# Patient Record
Sex: MALE | Race: WHITE | NOT HISPANIC OR LATINO | Employment: UNEMPLOYED | URBAN - NONMETROPOLITAN AREA
[De-identification: names, ages, dates, MRNs, and addresses within clinical notes are randomized per-mention and may not be internally consistent; named-entity substitution may affect disease eponyms.]

---

## 2017-07-11 ENCOUNTER — OFFICE VISIT (OUTPATIENT)
Dept: ORTHOPEDIC SURGERY | Facility: CLINIC | Age: 67
End: 2017-07-11

## 2017-07-11 VITALS — WEIGHT: 241 LBS | HEIGHT: 72 IN | BODY MASS INDEX: 32.64 KG/M2

## 2017-07-11 DIAGNOSIS — M17.0 ARTHRITIS OF BOTH KNEES: Primary | ICD-10-CM

## 2017-07-11 PROBLEM — M06.9 RHEUMATOID ARTHRITIS (HCC): Status: ACTIVE | Noted: 2017-07-11

## 2017-07-11 PROBLEM — M17.9 OSTEOARTHRITIS OF KNEE: Status: ACTIVE | Noted: 2017-07-11

## 2017-07-11 PROBLEM — K21.9 GASTROESOPHAGEAL REFLUX DISEASE: Status: ACTIVE | Noted: 2017-07-11

## 2017-07-11 PROBLEM — E78.00 HYPERCHOLESTEROLEMIA: Status: ACTIVE | Noted: 2017-07-11

## 2017-07-11 PROCEDURE — 20610 DRAIN/INJ JOINT/BURSA W/O US: CPT | Performed by: ORTHOPAEDIC SURGERY

## 2017-07-11 RX ORDER — FLUTICASONE PROPIONATE 50 MCG
SPRAY, SUSPENSION (ML) NASAL
Refills: 1 | COMMUNITY
Start: 2017-07-02 | End: 2018-02-01 | Stop reason: ALTCHOICE

## 2017-07-11 RX ORDER — ACETAMINOPHEN/DIPHENHYDRAMINE 500MG-25MG
TABLET ORAL DAILY
Refills: 0 | COMMUNITY
Start: 2017-06-30

## 2017-07-11 RX ORDER — ATENOLOL 50 MG/1
TABLET ORAL 2 TIMES DAILY
Refills: 0 | COMMUNITY
Start: 2017-05-10

## 2017-07-11 RX ORDER — ROPINIROLE 0.25 MG/1
TABLET, FILM COATED ORAL
Refills: 0 | COMMUNITY
Start: 2017-06-13

## 2017-07-11 RX ORDER — NITROGLYCERIN 0.4 MG/1
TABLET SUBLINGUAL
Refills: 0 | COMMUNITY
Start: 2017-05-23

## 2017-07-11 RX ORDER — ISOSORBIDE MONONITRATE 120 MG/1
TABLET, EXTENDED RELEASE ORAL DAILY
Refills: 0 | COMMUNITY
Start: 2017-06-30

## 2017-07-11 RX ORDER — ATORVASTATIN CALCIUM 10 MG/1
TABLET, FILM COATED ORAL
Refills: 0 | COMMUNITY
Start: 2017-06-30 | End: 2017-11-06

## 2017-07-11 RX ORDER — GABAPENTIN 800 MG/1
TABLET ORAL
Refills: 0 | COMMUNITY
Start: 2017-05-22

## 2017-07-11 RX ORDER — ACETAMINOPHEN AND CODEINE PHOSPHATE 300; 30 MG/1; MG/1
TABLET ORAL
Refills: 0 | COMMUNITY
Start: 2017-05-31 | End: 2018-02-01 | Stop reason: ALTCHOICE

## 2017-07-11 RX ORDER — OMEPRAZOLE 40 MG/1
CAPSULE, DELAYED RELEASE ORAL
Refills: 0 | COMMUNITY
Start: 2017-06-30 | End: 2018-02-01 | Stop reason: ALTCHOICE

## 2017-07-11 NOTE — PROGRESS NOTES
"  Patient: Juan Edwards    YOB: 1950        History of Present Illness:   Patient presents for bilateral Visco supplementation injections of his knees.  He has received this in the past this done quite well for him.      Review of Systems:    Pertinent positives evaluated and listed in HPI, others systems completed by patient and reviewed today.         Physical Exam: 67 y.o. male  General Appearance:    Alert and oriented x 3, cooperative, in no acute distress                   Vitals:    07/11/17 1308   Weight: 241 lb (109 kg)   Height: 72\" (182.9 cm)          Essentially unchanged      Radiology:           Large Joint Arthrocentesis  Date/Time: 7/11/2017 1:28 PM  Consent given by: patient  Site marked: site marked  Supporting Documentation  Indications: pain and diagnostic evaluation   Procedure Details  Location: knee - R knee  Needle size: 25 G  Approach: anterolateral  Medications administered: 48 mg hylan 48 MG/6ML  Patient tolerance: patient tolerated the procedure well with no immediate complications    Large Joint Arthrocentesis  Date/Time: 7/11/2017 1:29 PM  Consent given by: patient  Site marked: site marked  Supporting Documentation  Indications: pain and diagnostic evaluation   Procedure Details  Location: knee - L knee  Needle size: 25 G  Approach: anterolateral  Medications administered: 48 mg hylan 48 MG/6ML  Patient tolerance: patient tolerated the procedure well with no immediate complications                Assessment/Plan:    Bilateral knee arthritis.  Repeated Synvisc 1 injections today.  We'll contact us in the future as needed      Discussion/Summary:    This chart was completed utilizing the dragon speech recognition software.  Grammatical errors, random word insertions, pronoun errors, and incomplete sentences or occasional consequences of the system due to software limitations, ambient noise, and hardware issues.  Any questions or concerns about the content, text, or " information contained within the body of this dictation should be directly addressed to the physician for clarification        This document was signed by Gustabo Bhardwaj M.D. July 11, 2017 1:27 PM

## 2017-08-23 ENCOUNTER — OFFICE VISIT (OUTPATIENT)
Dept: UROLOGY | Facility: CLINIC | Age: 67
End: 2017-08-23

## 2017-08-23 DIAGNOSIS — R53.83 OTHER FATIGUE: ICD-10-CM

## 2017-08-23 DIAGNOSIS — N40.0 BPH WITHOUT URINARY OBSTRUCTION: Primary | ICD-10-CM

## 2017-08-23 LAB
ANION GAP SERPL CALCULATED.3IONS-SCNC: 5.7 MMOL/L (ref 3.6–11.2)
BUN BLD-MCNC: 12 MG/DL (ref 7–21)
BUN/CREAT SERPL: 11.4 (ref 7–25)
CALCIUM SPEC-SCNC: 9.2 MG/DL (ref 7.7–10)
CHLORIDE SERPL-SCNC: 112 MMOL/L (ref 99–112)
CO2 SERPL-SCNC: 25.3 MMOL/L (ref 24.3–31.9)
CREAT BLD-MCNC: 1.05 MG/DL (ref 0.43–1.29)
GFR SERPL CREATININE-BSD FRML MDRD: 70 ML/MIN/1.73
GLUCOSE BLD-MCNC: 102 MG/DL (ref 70–110)
OSMOLALITY SERPL CALC.SUM OF ELEC: 284.9 MOSM/KG (ref 273–305)
POTASSIUM BLD-SCNC: 4 MMOL/L (ref 3.5–5.3)
PSA SERPL-MCNC: 1.33 NG/ML (ref 0–4)
SODIUM BLD-SCNC: 143 MMOL/L (ref 135–153)

## 2017-08-23 PROCEDURE — 36415 COLL VENOUS BLD VENIPUNCTURE: CPT | Performed by: UROLOGY

## 2017-08-23 PROCEDURE — 99203 OFFICE O/P NEW LOW 30 MIN: CPT | Performed by: UROLOGY

## 2017-08-23 PROCEDURE — 84153 ASSAY OF PSA TOTAL: CPT | Performed by: UROLOGY

## 2017-08-23 PROCEDURE — 80048 BASIC METABOLIC PNL TOTAL CA: CPT | Performed by: UROLOGY

## 2017-08-23 RX ORDER — TAMSULOSIN HYDROCHLORIDE 0.4 MG/1
1 CAPSULE ORAL NIGHTLY
Qty: 30 CAPSULE | Refills: 3 | Status: SHIPPED | OUTPATIENT
Start: 2017-08-23 | End: 2017-12-14 | Stop reason: SDUPTHER

## 2017-08-23 NOTE — PROGRESS NOTES
Chief Complaint:          No chief complaint on file.      HPI:   67 y.o. male.  67-year-old white male here to establish care has urgency and rare urge related incontinence gets up twice at night.  4 times during the day.  There is no dysuria.  There is no hematuria.  States he was hospitalized previously.  This was years ago he has a remote history of a prior stone.  He has no dribbling.  He has a good stream he is on no medications he sees Dr. Winston and has never had an examination.  She does not remember having a PSA.  He does not remember having a history of diabetes.  He is not a smoker.  He is accompanied by his wife who has a simple cyst in the kidney        Past Medical History:        Past Medical History:   Diagnosis Date   • Acid reflux    • Cardiac disease    • Hypertension    • Osteoarthritis     in both knees   • Rheumatoid arthritis          Current Meds:     Current Outpatient Prescriptions   Medication Sig Dispense Refill   • acetaminophen-codeine (TYLENOL #3) 300-30 MG per tablet TAKE 1 TABLET BY MOUTH AS NEEDED FOR PAIN  0   • atenolol (TENORMIN) 50 MG tablet   0   • atorvastatin (LIPITOR) 10 MG tablet   0   • fluticasone (FLONASE) 50 MCG/ACT nasal spray instill 2 sprays into each nostril once daily  1   • gabapentin (NEURONTIN) 800 MG tablet TAKE ONE TABLET BY MOUTH TWICE DAILY  0   • isosorbide mononitrate (IMDUR) 120 MG 24 hr tablet   0   • nitroglycerin (NITROSTAT) 0.4 MG SL tablet place 1 tablet under the tongue every 5 minutes for UP TO 3 doses...  (REFER TO PRESCRIPTION NOTES).  0   • omeprazole (priLOSEC) 40 MG capsule take 1 capsule by mouth once daily  0   • RA ASPIRIN EC 81 MG EC tablet   0   • rOPINIRole (REQUIP) 0.25 MG tablet take 1 tablet by mouth at bedtime  0     No current facility-administered medications for this visit.         Allergies:      Allergies   Allergen Reactions   • Propoxyphene    • Propranolol         Past Surgical History:     Past Surgical History:   Procedure  Laterality Date   • CATARACT EXTRACTION     • CORONARY ANGIOPLASTY WITH STENT PLACEMENT     • EYE SURGERY     • HERNIA REPAIR     • KNEE SURGERY     • SINUS SURGERY     • TONSILLECTOMY     • VEIN SURGERY           Social History:     Social History     Social History   • Marital status:      Spouse name: N/A   • Number of children: N/A   • Years of education: N/A     Occupational History   • Not on file.     Social History Main Topics   • Smoking status: Never Smoker   • Smokeless tobacco: Never Used   • Alcohol use No   • Drug use: No   • Sexual activity: Not on file     Other Topics Concern   • Not on file     Social History Narrative       Family History:     Family History   Problem Relation Age of Onset   • Cancer Sister    • Diabetes Sister    • Arthritis Sister      Osteoarthritis and Rheumatoid   • Osteoporosis Sister    • Hypertension Sister    • Gout Sister    • Cancer Brother    • Diabetes Brother    • Osteoporosis Brother    • Arthritis Brother      Osteoarthritis and Rheumatoid   • Hypertension Brother    • Gout Brother        Review of Systems:     Review of Systems   Constitutional: Negative.  Negative for chills, fatigue and fever.   Eyes: Negative.    Respiratory: Positive for shortness of breath. Negative for cough and wheezing.    Cardiovascular: Negative.  Negative for leg swelling.   Gastrointestinal: Negative.  Negative for abdominal pain, nausea and vomiting.   Endocrine: Negative.    Musculoskeletal: Positive for back pain. Negative for joint swelling.   Allergic/Immunologic: Negative.    Neurological: Positive for dizziness and headaches.   Hematological: Negative.    Psychiatric/Behavioral: Negative.  Negative for confusion.       Physical Exam:     Physical Exam   Constitutional: He is oriented to person, place, and time. He appears well-developed and well-nourished.   HENT:   Head: Normocephalic and atraumatic.   Eyes: Conjunctivae and EOM are normal. Pupils are equal, round, and  reactive to light.   Neck: Normal range of motion.   Cardiovascular: Normal rate, regular rhythm, normal heart sounds and intact distal pulses.    Pulmonary/Chest: Effort normal and breath sounds normal.   Abdominal: Soft. Bowel sounds are normal.   Genitourinary: Rectum normal, prostate normal and penis normal.   Genitourinary Comments: Soft nontender abdomen with no organomegaly, rigidity, or tenderness.  He has normal external genitalia and uncircumcised phallus with a freely movable foreskin bilaterally descended testes without masses there is no inguinal hernias adenopathy or abnormalities he had good rectal tone and a large smooth firm prostate.  There is no nodularity or any suspicious rectal abnormalities     Musculoskeletal: Normal range of motion.   Neurological: He is alert and oriented to person, place, and time. He has normal reflexes.   Skin: Skin is warm and dry.   Psychiatric: He has a normal mood and affect. His behavior is normal. Judgment and thought content normal.   Nursing note and vitals reviewed.      Procedure:       Assessment:   No diagnosis found.  No orders of the defined types were placed in this encounter.      Plan:   BPH-currently asymptomatic recommend only observation  PSA testing-I am recommending a PSA blood test that stands for prostate specific antigen.  I discussed the pathophysiology of PSA testing indicating its use in the diagnosis and management of prostate cancer.  I discussed the normal range being 0-4 but more appropriately being much closer to 0-2 in a normal male.  I discussed the fact that after certain age we don't recommend PSA testing especially in view of numerous comorbidities.  That this will not be a useful test.  I discussed many of the things that can artificially raised PSA including a recent infection, urinary tract infection, recent sexual intercourse.  Where even the type of movement such as manipulation of the prostate  riding a bicycle.  After all this  is taken into account when the test is reviewed  the most important use of PSA which is the velocity measurement in other words the change of PSA with time as a very important factor in the use and that we look for greater than 20% rise over a year to help us make the prediction of prostate cancer.  I also discussed that the use with prostate cancer indicating that after a radical prostatectomy the PSA should be 0 and any rise indicates an early biochemical recurrence**           This document has been electronically signed by EJ URBINA MD August 23, 2017 2:20 PM

## 2017-08-25 PROBLEM — N40.0 BPH WITHOUT URINARY OBSTRUCTION: Status: ACTIVE | Noted: 2017-08-25

## 2017-11-06 ENCOUNTER — OFFICE VISIT (OUTPATIENT)
Dept: CARDIOLOGY | Facility: CLINIC | Age: 67
End: 2017-11-06

## 2017-11-06 VITALS
HEIGHT: 72 IN | OXYGEN SATURATION: 96 % | DIASTOLIC BLOOD PRESSURE: 78 MMHG | SYSTOLIC BLOOD PRESSURE: 104 MMHG | WEIGHT: 247 LBS | BODY MASS INDEX: 33.46 KG/M2 | HEART RATE: 53 BPM

## 2017-11-06 DIAGNOSIS — E78.5 DYSLIPIDEMIA: ICD-10-CM

## 2017-11-06 DIAGNOSIS — E78.00 HYPERCHOLESTEROLEMIA: ICD-10-CM

## 2017-11-06 DIAGNOSIS — I20.0 UNSTABLE ANGINA PECTORIS (HCC): ICD-10-CM

## 2017-11-06 DIAGNOSIS — I10 ESSENTIAL HYPERTENSION: ICD-10-CM

## 2017-11-06 DIAGNOSIS — I25.119 CORONARY ARTERY DISEASE INVOLVING NATIVE CORONARY ARTERY OF NATIVE HEART WITH ANGINA PECTORIS (HCC): Primary | ICD-10-CM

## 2017-11-06 PROCEDURE — 99204 OFFICE O/P NEW MOD 45 MIN: CPT | Performed by: INTERNAL MEDICINE

## 2017-11-06 RX ORDER — RANOLAZINE 1000 MG/1
1000 TABLET, EXTENDED RELEASE ORAL 2 TIMES DAILY
COMMUNITY

## 2017-11-06 RX ORDER — ROSUVASTATIN CALCIUM 10 MG/1
10 TABLET, COATED ORAL DAILY
COMMUNITY

## 2017-11-06 RX ORDER — ACETAMINOPHEN 500 MG
500 TABLET ORAL EVERY 6 HOURS PRN
COMMUNITY

## 2017-11-06 RX ORDER — LORATADINE 10 MG/1
CAPSULE, LIQUID FILLED ORAL DAILY
COMMUNITY

## 2017-11-06 NOTE — PROGRESS NOTES
1950  [phone]      11/06/2017    PCP: No Known Provider  [facility]  [place] [id number]    IDENTIFICATION: A 67 y.o. male   CC:  Chief Complaint   Patient presents with   • Chest Pain     PROBLEM LIST:  1. CAD   A)Chest pain and abnormal MPS 2/16 ischemia in inferior and inferolateral region, EF 50%   B)[facility] Dr. Toro 3/16 LAD 70% involving first diagonal with more distal diagonal 90 disease treated with medical therapy.  The CX had no significant disease.  The RCA                is dominant and has a significant 99% lesion treated with Stent. Normal EF              C)  Chest pain syndrome treated with Imdur, Ranexa.   2. HTN  3. HLD  4. Obestiy  5. Gerd  6. Chronic back pain, remote back surgery.  7. BPH  8. OA    Allergies  Allergies   Allergen Reactions   • Propoxyphene    • Propranolol Rash       Current Medications    Current Outpatient Prescriptions:   •  acetaminophen (TYLENOL) 500 MG tablet, Take 500 mg by mouth Every 6 (Six) Hours As Needed for Mild Pain ., Disp: , Rfl:   •  acetaminophen-codeine (TYLENOL #3) 300-30 MG per tablet, TAKE 1 TABLET BY MOUTH AS NEEDED FOR PAIN, Disp: , Rfl: 0  •  atenolol (TENORMIN) 50 MG tablet, 2 (Two) Times a Day., Disp: , Rfl: 0  •  fluticasone (FLONASE) 50 MCG/ACT nasal spray, instill 2 sprays into each nostril once daily, Disp: , Rfl: 1  •  gabapentin (NEURONTIN) 800 MG tablet, TAKE ONE TABLET BY MOUTH TWICE DAILY, Disp: , Rfl: 0  •  GLUCOSAMINE-CHONDROITIN PO, Take  by mouth Daily., Disp: , Rfl:   •  isosorbide mononitrate (IMDUR) 120 MG 24 hr tablet, Daily., Disp: , Rfl: 0  •  Loratadine 10 MG capsule, Take  by mouth Daily., Disp: , Rfl:   •  nitroglycerin (NITROSTAT) 0.4 MG SL tablet, place 1 tablet under the tongue every 5 minutes for UP TO 3 doses...  (REFER TO PRESCRIPTION NOTES)., Disp: , Rfl: 0  •  omeprazole (priLOSEC) 40 MG capsule, take 1 capsule by mouth once daily, Disp: , Rfl: 0  •  RA ASPIRIN EC 81 MG EC tablet, Daily., Disp: , Rfl: 0  •   ranolazine (RANEXA) 1000 MG 12 hr tablet, Take 1,000 mg by mouth 2 (Two) Times a Day., Disp: , Rfl:   •  rOPINIRole (REQUIP) 0.25 MG tablet, take 1 tablet by mouth at bedtime, Disp: , Rfl: 0  •  rosuvastatin (CRESTOR) 10 MG tablet, Take 10 mg by mouth Daily., Disp: , Rfl:   •  tamsulosin (FLOMAX) 0.4 MG capsule 24 hr capsule, Take 1 capsule by mouth Every Night., Disp: 30 capsule, Rfl: 3    History of Present Illness   HPI  The patient is a pleasant 67 year old gentleman presents today for a consultation regarding a history of coronary disease at the request of his primary care provider Dr. Sal.  The patient has a known history of coronary disease is status post a RCA stent by Dr. Toro [facility] in 2016.  Today he reports that he was unhappy about having the stent placed and felt that it was not necessary.  He also reports he is aware that he's had other types of coronary disease during this cath was treated medical management and he requested something be done about it.  He states that recently the past couple weeks she's had used a couple sublingual nitroglycerin.  This pain occurred while at rest watching TV and is relieved with nitroglycerin one time.  He denies any exertional symptoms.  He is somewhat limited because of chronic back pain.  He denies any heart failure symptoms.  Denies palpitations syncope or near-syncope events.      ROS  Review of Systems   Constitutional: Negative.    HENT: Negative.    Eyes: Negative.    Respiratory: Negative.    Cardiovascular: Positive for chest pain.   Gastrointestinal: Negative.    Endocrine: Negative.    Genitourinary: Negative.    Musculoskeletal: Negative.    Skin: Negative.    Neurological: Negative.    Hematological: Negative.    Psychiatric/Behavioral: Negative.        SOCIAL HX  Social History     Social History   • Marital status:      Spouse name: N/A   • Number of children: N/A   • Years of education: N/A     Occupational History   • Not on  "file.     Social History Main Topics   • Smoking status: Never Smoker   • Smokeless tobacco: Never Used   • Alcohol use No   • Drug use: No   • Sexual activity: Defer     Other Topics Concern   • Not on file     Social History Narrative       FAMILY HX  Family History   Problem Relation Age of Onset   • Cancer Sister    • Diabetes Sister    • Arthritis Sister      Osteoarthritis and Rheumatoid   • Osteoporosis Sister    • Hypertension Sister    • Gout Sister    • Cancer Brother    • Diabetes Brother    • Osteoporosis Brother    • Arthritis Brother      Osteoarthritis and Rheumatoid   • Hypertension Brother    • Gout Brother    • Heart attack Father        Vitals:    11/06/17 1041   BP: 104/78   BP Location: Right arm   Patient Position: Sitting   Pulse: 53   SpO2: 96%   Weight: 247 lb (112 kg)   Height: 72\" (182.9 cm)       PHYSICAL EXAMINATION:  Physical Exam   Constitutional: He is oriented to person, place, and time. He appears well-developed and well-nourished.   HENT:   Head: Normocephalic and atraumatic.   Eyes: EOM are normal. Pupils are equal, round, and reactive to light. Right eye exhibits no discharge. Left eye exhibits no discharge.   Neck: Normal range of motion. Neck supple. No JVD present. No tracheal deviation present. No thyromegaly present.   Cardiovascular: Normal rate, regular rhythm and normal heart sounds.  Exam reveals no gallop and no friction rub.    No murmur heard.  Pulmonary/Chest: Effort normal and breath sounds normal. No stridor. No respiratory distress. He has no wheezes. He has no rales. He exhibits no tenderness.   Abdominal: Soft. Bowel sounds are normal.   Musculoskeletal: Normal range of motion. He exhibits edema.   Lymphadenopathy:     He has no cervical adenopathy.   Neurological: He is alert and oriented to person, place, and time. No cranial nerve deficit. Coordination normal.   Skin: Skin is warm and dry.       Diagnostic Data:  Procedures  No results found for: CHLPL, TRIG, " HDL, LDLDIRECT  Lab Results   Component Value Date    GLUCOSE 102 08/23/2017    BUN 12 08/23/2017    CREATININE 1.05 08/23/2017     08/23/2017    K 4.0 08/23/2017     08/23/2017    CO2 25.3 08/23/2017     No results found for: HGBA1C  No results found for: WBC, HGB, HCT, PLT    ASSESSMENT:   Diagnosis Plan   1. Coronary artery disease involving native coronary artery of native heart with angina pectoris     2. Hypercholesterolemia     3. Essential hypertension     4. Dyslipidemia         PLAN:  · MPS  · Continue medical therapy asa,bb,statin, and Imdur.    Cristobal Vigil MD, FACC

## 2017-11-09 ENCOUNTER — OFFICE VISIT (OUTPATIENT)
Dept: UROLOGY | Facility: CLINIC | Age: 67
End: 2017-11-09

## 2017-11-09 VITALS — HEIGHT: 72 IN | BODY MASS INDEX: 33.46 KG/M2 | WEIGHT: 247 LBS

## 2017-11-09 DIAGNOSIS — N40.0 BPH WITHOUT URINARY OBSTRUCTION: Primary | ICD-10-CM

## 2017-11-09 DIAGNOSIS — N32.81 DETRUSOR INSTABILITY: ICD-10-CM

## 2017-11-09 PROCEDURE — 99213 OFFICE O/P EST LOW 20 MIN: CPT | Performed by: UROLOGY

## 2017-11-09 NOTE — PROGRESS NOTES
Chief Complaint:          Chief Complaint   Patient presents with   • Benign Prostatic Hypertrophy       HPI:   67 y.o. male.  67-year-old white male here to establish care has urgency and rare urge related incontinence gets up twice at night.  4 times during the day.  There is no dysuria.  There is no hematuria.  States he was hospitalized previously.  This was years ago he has a remote history of a prior stone.  He has no dribbling.  He has a good stream he is on no medications he sees Dr. Winston and has never had an examination.  She does not remember having a PSA.  He does not remember having a history of diabetes.  He is not a smoker.  He is accompanied by His wife who has a simple renal cyst his PSA was 1.330 his creatinine is 1.05.  He went from getting up multiple times a night to 0 to one time in 2-3 times during the day he still not satisfied with this.  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     • acetaminophen-codeine (TYLENOL #3) 300-30 MG per tablet TAKE 1 TABLET BY MOUTH AS NEEDED FOR PAIN  0   • atenolol (TENORMIN) 50 MG tablet 2 (Two) Times a Day.  0   • fluticasone (FLONASE) 50 MCG/ACT nasal spray instill 2 sprays into each nostril once daily  1   • gabapentin (NEURONTIN) 800 MG tablet TAKE ONE TABLET BY MOUTH TWICE DAILY  0   • GLUCOSAMINE-CHONDROITIN PO Take  by mouth Daily.     • isosorbide mononitrate (IMDUR) 120 MG 24 hr tablet Daily.  0   •  Loratadine 10 MG capsule Take  by mouth Daily.     • nitroglycerin (NITROSTAT) 0.4 MG SL tablet place 1 tablet under the tongue every 5 minutes for UP TO 3 doses...  (REFER TO PRESCRIPTION NOTES).  0   • omeprazole (priLOSEC) 40 MG capsule take 1 capsule by mouth once daily  0   • RA ASPIRIN EC 81 MG EC tablet Daily.  0   • ranolazine (RANEXA) 1000 MG 12 hr tablet Take 1,000 mg by mouth 2 (Two) Times a Day.     • rOPINIRole (REQUIP) 0.25 MG tablet take 1 tablet by mouth at bedtime  0   • rosuvastatin (CRESTOR) 10 MG tablet Take 10 mg by mouth Daily.     • tamsulosin (FLOMAX) 0.4 MG capsule 24 hr capsule Take 1 capsule by mouth Every Night. 30 capsule 3     No current facility-administered medications for this visit.         Allergies:      Allergies   Allergen Reactions   • Propoxyphene    • Propranolol Rash        Past Surgical History:     Past Surgical History:   Procedure Laterality Date   • CATARACT EXTRACTION     • CORONARY ANGIOPLASTY WITH STENT PLACEMENT     • EYE SURGERY     • HERNIA REPAIR     • KNEE SURGERY     • SINUS SURGERY     • TONSILLECTOMY     • VEIN SURGERY           Social History:     Social History     Social History   • Marital status:      Spouse name: N/A   • Number of children: N/A   • Years of education: N/A     Occupational History   • Not on file.     Social History Main Topics   • Smoking status: Never Smoker   • Smokeless tobacco: Never Used   • Alcohol use No   • Drug use: No   • Sexual activity: Defer     Other Topics Concern   • Not on file     Social History Narrative       Family History:     Family History   Problem Relation Age of Onset   • Cancer Sister    • Diabetes Sister    • Arthritis Sister      Osteoarthritis and Rheumatoid   • Osteoporosis Sister    • Hypertension Sister    • Gout Sister    • Cancer Brother    • Diabetes Brother    • Osteoporosis Brother    • Arthritis Brother      Osteoarthritis and Rheumatoid   • Hypertension Brother    • Gout Brother    •  Heart attack Father        Review of Systems:     Review of Systems   Constitutional: Negative.    HENT: Negative.    Eyes: Negative.    Respiratory: Negative.    Cardiovascular: Negative.    Gastrointestinal: Negative.    Endocrine: Negative.    Genitourinary: Positive for frequency.   Musculoskeletal: Negative.    Allergic/Immunologic: Negative.    Neurological: Negative.    Hematological: Negative.    Psychiatric/Behavioral: Negative.        Physical Exam:     Physical Exam   Constitutional: He is oriented to person, place, and time. He appears well-developed and well-nourished.   HENT:   Head: Normocephalic and atraumatic.   Eyes: Conjunctivae and EOM are normal. Pupils are equal, round, and reactive to light.   Neck: Normal range of motion.   Cardiovascular: Normal rate, regular rhythm, normal heart sounds and intact distal pulses.    Pulmonary/Chest: Effort normal and breath sounds normal.   Abdominal: Soft. Bowel sounds are normal.   Musculoskeletal: Normal range of motion.   Neurological: He is alert and oriented to person, place, and time. He has normal reflexes.   Skin: Skin is warm and dry.   Psychiatric: He has a normal mood and affect. His behavior is normal. Judgment and thought content normal.   Nursing note and vitals reviewed.      Procedure:       Assessment:   No diagnosis found.  No orders of the defined types were placed in this encounter.      Plan:   BPH: Discussed the pathophysiology of BPH and obstruction.  We discussed the static and dynamic effect effects of BPH as well as using 5 alpha reductase inhibitors versus alpha blockade.  We discussed the indications for transurethral surgery as well.  And/ or other therapeutic options available including all of the newer techniques.  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

## 2017-11-21 ENCOUNTER — HOSPITAL ENCOUNTER (OUTPATIENT)
Dept: CARDIOLOGY | Facility: HOSPITAL | Age: 67
Discharge: HOME | End: 2017-11-21
Attending: INTERNAL MEDICINE

## 2017-11-21 ENCOUNTER — HOSPITAL ENCOUNTER (OUTPATIENT)
Dept: CARDIOLOGY | Facility: HOSPITAL | Age: 67
Discharge: HOME | End: 2017-11-21
Attending: INTERNAL MEDICINE | Admitting: INTERNAL MEDICINE

## 2017-11-21 VITALS
DIASTOLIC BLOOD PRESSURE: 82 MMHG | HEART RATE: 68 BPM | SYSTOLIC BLOOD PRESSURE: 146 MMHG | WEIGHT: 247 LBS | BODY MASS INDEX: 33.46 KG/M2 | HEIGHT: 72 IN

## 2017-11-21 DIAGNOSIS — I20.0 UNSTABLE ANGINA PECTORIS (HCC): ICD-10-CM

## 2017-11-21 DIAGNOSIS — I25.119 CORONARY ARTERY DISEASE INVOLVING NATIVE CORONARY ARTERY OF NATIVE HEART WITH ANGINA PECTORIS (HCC): ICD-10-CM

## 2017-11-21 LAB
BH CV STRESS BP STAGE 1: NORMAL
BH CV STRESS BP STAGE 3: NORMAL
BH CV STRESS COMMENTS STAGE 1: NORMAL
BH CV STRESS DOSE REGADENOSON STAGE 1: 0.4
BH CV STRESS DURATION MIN STAGE 1: 1
BH CV STRESS DURATION MIN STAGE 2: 1
BH CV STRESS DURATION MIN STAGE 3: 1
BH CV STRESS DURATION MIN STAGE 4: 1
BH CV STRESS DURATION SEC STAGE 2: 0
BH CV STRESS HR STAGE 1: 75
BH CV STRESS HR STAGE 2: 78
BH CV STRESS HR STAGE 3: 78
BH CV STRESS HR STAGE 4: 76
BH CV STRESS PROTOCOL 1: NORMAL
BH CV STRESS RECOVERY BP: NORMAL MMHG
BH CV STRESS RECOVERY HR: 72 BPM
BH CV STRESS STAGE 1: 1
BH CV STRESS STAGE 2: 2
BH CV STRESS STAGE 3: 3
BH CV STRESS STAGE 4: 4
LV EF NUC BP: 77 %
MAXIMAL PREDICTED HEART RATE: 153 BPM
PERCENT MAX PREDICTED HR: 53.59 %
STRESS BASELINE BP: NORMAL MMHG
STRESS BASELINE HR: 55 BPM
STRESS PERCENT HR: 63 %
STRESS POST EXERCISE DUR MIN: 4 MIN
STRESS POST EXERCISE DUR SEC: 0 SEC
STRESS POST PEAK BP: NORMAL MMHG
STRESS POST PEAK HR: 82 BPM
STRESS TARGET HR: 130 BPM

## 2017-11-21 PROCEDURE — 78452 HT MUSCLE IMAGE SPECT MULT: CPT

## 2017-11-21 PROCEDURE — 93018 CV STRESS TEST I&R ONLY: CPT | Performed by: INTERNAL MEDICINE

## 2017-11-21 PROCEDURE — 93017 CV STRESS TEST TRACING ONLY: CPT

## 2017-11-21 PROCEDURE — A9500 TC99M SESTAMIBI: HCPCS | Performed by: INTERNAL MEDICINE

## 2017-11-21 PROCEDURE — 25010000002 REGADENOSON 0.4 MG/5ML SOLUTION: Performed by: INTERNAL MEDICINE

## 2017-11-21 PROCEDURE — 0 TECHNETIUM SESTAMIBI: Performed by: INTERNAL MEDICINE

## 2017-11-21 PROCEDURE — 78452 HT MUSCLE IMAGE SPECT MULT: CPT | Performed by: INTERNAL MEDICINE

## 2017-11-21 RX ADMIN — TECHNETIUM TC-99M SESTAMIBI 1 DOSE: 1 INJECTION INTRAVENOUS at 12:00

## 2017-11-21 RX ADMIN — TECHNETIUM TC-99M SESTAMIBI 1 DOSE: 1 INJECTION INTRAVENOUS at 10:20

## 2017-11-21 RX ADMIN — REGADENOSON 0.4 MG: 0.08 INJECTION, SOLUTION INTRAVENOUS at 12:01

## 2017-11-22 ENCOUNTER — TELEPHONE (OUTPATIENT)
Dept: CARDIOLOGY | Facility: CLINIC | Age: 67
End: 2017-11-22

## 2017-11-22 DIAGNOSIS — R94.39 ABNORMAL STRESS TEST: Primary | ICD-10-CM

## 2017-11-22 NOTE — TELEPHONE ENCOUNTER
Called patient and left VM that his stress test was abnormal and Dr Vigil recommends he have a heart cath. Scheduling will call to set up and please call back with any questions.

## 2017-11-27 ENCOUNTER — TELEPHONE (OUTPATIENT)
Dept: CARDIOLOGY | Facility: CLINIC | Age: 67
End: 2017-11-27

## 2017-11-27 NOTE — TELEPHONE ENCOUNTER
Shana in procedure scheduling called patient to set up heart cath patient refused. Patient stated he had his other cardiologist look at the report and he does not need a heart cath.

## 2017-12-07 NOTE — TELEPHONE ENCOUNTER
Pharmacy called and Patient received an refill on Myrbetriq 50 # 30 with no refills. Ayleen reviewed and stated it was okay to refill. The patient has an appt on 12/14 to see Dr dwyer.

## 2017-12-12 ENCOUNTER — OFFICE VISIT (OUTPATIENT)
Dept: SURGERY | Facility: CLINIC | Age: 67
End: 2017-12-12

## 2017-12-12 VITALS
HEIGHT: 72 IN | SYSTOLIC BLOOD PRESSURE: 132 MMHG | TEMPERATURE: 98.1 F | HEART RATE: 66 BPM | BODY MASS INDEX: 33.44 KG/M2 | DIASTOLIC BLOOD PRESSURE: 86 MMHG | WEIGHT: 246.91 LBS

## 2017-12-12 DIAGNOSIS — Z98.890 HISTORY OF UMBILICAL HERNIA REPAIR: ICD-10-CM

## 2017-12-12 DIAGNOSIS — R10.33 PERIUMBILICAL ABDOMINAL PAIN: Primary | ICD-10-CM

## 2017-12-12 DIAGNOSIS — I25.119 CORONARY ARTERY DISEASE INVOLVING NATIVE CORONARY ARTERY OF NATIVE HEART WITH ANGINA PECTORIS (HCC): ICD-10-CM

## 2017-12-12 DIAGNOSIS — Z87.19 HISTORY OF UMBILICAL HERNIA REPAIR: ICD-10-CM

## 2017-12-12 PROCEDURE — 99204 OFFICE O/P NEW MOD 45 MIN: CPT | Performed by: SURGERY

## 2017-12-12 RX ORDER — BUTALBITAL, ACETAMINOPHEN AND CAFFEINE 50; 325; 40 MG/1; MG/1; MG/1
1 TABLET ORAL ONCE
COMMUNITY

## 2017-12-12 NOTE — PROGRESS NOTES
12/12/2017    Patient Information  Juan Edwards  [address]  [facility] [address]  1950  [phone] (home)     Chief Complaint   Patient presents with   • Consultation     Needing Mesh removed from stomach/ Chris Referral       HPI  Patient is a 67-year-old white male referred by Dr. Perez.  Patient has some mesh in his abdomen for an umbilical hernia repair.  While in [place] in 2009 he had an umbilical hernia repair with mesh.  He said it hurting so bad that he had to taking him back remove the mesh and put more mesh in 2010.  He reports the mesh is now hurting him like it did before.  He says is been going on for 2 years.    Review of Systems:  The Review of Systems has been reviewed and confirmed.    General: negative  Integumentary: negative  Eyes: eyesight problems, glasses  ENT: negative  Respiratory: shortness of breath  Gastrointestinal: diarrhea and abdominal pain  Cardiovascular: palpitations  Neurological: headaches and dizziness  Psychiatric: negative  Hematologic/Lymphatic: negative  Genitourinary: negative  Musculoskeletal: painful joints, back pain and joint stiffness  Endocrine: negative  Breasts: negative      Patient Active Problem List   Diagnosis   • Arthritis of both knees   • Gastroesophageal reflux disease   • Hypercholesterolemia   • Osteoarthritis of knee   • Rheumatoid arthritis   • BPH without urinary obstruction   • Coronary artery disease involving native coronary artery of native heart with angina pectoris   • Essential hypertension   • Dyslipidemia   • Detrusor instability         Past Medical History:   Diagnosis Date   • Acid reflux    • Cardiac disease    • Coronary artery disease    • Hypertension    • Osteoarthritis     in both knees   • Rheumatoid arthritis          Past Surgical History:   Procedure Laterality Date   • CATARACT EXTRACTION     • CORONARY ANGIOPLASTY WITH STENT PLACEMENT     • EYE SURGERY     • HERNIA REPAIR     • KNEE SURGERY     • SINUS SURGERY      • TONSILLECTOMY           Family History   Problem Relation Age of Onset   • Cancer Sister    • Diabetes Sister    • Arthritis Sister      Osteoarthritis and Rheumatoid   • Osteoporosis Sister    • Hypertension Sister    • Gout Sister    • Cancer Brother    • Diabetes Brother    • Osteoporosis Brother    • Arthritis Brother      Osteoarthritis and Rheumatoid   • Hypertension Brother    • Gout Brother    • Heart attack Father          Social History   Substance Use Topics   • Smoking status: Never Smoker   • Smokeless tobacco: Never Used   • Alcohol use No       Current Outpatient Prescriptions   Medication Sig Dispense Refill   • acetaminophen-codeine (TYLENOL #3) 300-30 MG per tablet TAKE 1 TABLET BY MOUTH AS NEEDED FOR PAIN  0   • atenolol (TENORMIN) 50 MG tablet 2 (Two) Times a Day.  0   • butalbital-acetaminophen-caffeine (FIORICET, ESGIC) -40 MG per tablet Take 1 tablet by mouth 1 (One) Time.     • fluticasone (FLONASE) 50 MCG/ACT nasal spray instill 2 sprays into each nostril once daily  1   • gabapentin (NEURONTIN) 800 MG tablet TAKE ONE TABLET BY MOUTH TWICE DAILY  0   • GLUCOSAMINE-CHONDROITIN PO Take  by mouth Daily.     • isosorbide mononitrate (IMDUR) 120 MG 24 hr tablet Daily.  0   • Loratadine 10 MG capsule Take  by mouth Daily.     • nitroglycerin (NITROSTAT) 0.4 MG SL tablet place 1 tablet under the tongue every 5 minutes for UP TO 3 doses...  (REFER TO PRESCRIPTION NOTES).  0   • omeprazole (priLOSEC) 40 MG capsule take 1 capsule by mouth once daily  0   • RA ASPIRIN EC 81 MG EC tablet Daily.  0   • ranolazine (RANEXA) 1000 MG 12 hr tablet Take 1,000 mg by mouth 2 (Two) Times a Day.     • rOPINIRole (REQUIP) 0.25 MG tablet take 1 tablet by mouth at bedtime  0   • rosuvastatin (CRESTOR) 10 MG tablet Take 10 mg by mouth Daily.     • tamsulosin (FLOMAX) 0.4 MG capsule 24 hr capsule Take 1 capsule by mouth Every Night. 30 capsule 3   • acetaminophen (TYLENOL) 500 MG tablet Take 500 mg by mouth  "Every 6 (Six) Hours As Needed for Mild Pain .       No current facility-administered medications for this visit.          Allergies  Propoxyphene and Propranolol    /86  Pulse 66  Temp 98.1 °F (36.7 °C)  Ht 182.9 cm (72.01\")  Wt 112 kg (246 lb 14.6 oz)  BMI 33.48 kg/m2     Physical Exam   Constitutional: He is oriented to person, place, and time. He appears well-developed and well-nourished. No distress.   HENT:   Head: Normocephalic.   Right Ear: External ear normal.   Left Ear: External ear normal.   Nose: Nose normal.   Mouth/Throat: Oropharynx is clear and moist.   Eyes: Conjunctivae and EOM are normal. Right eye exhibits no discharge. Left eye exhibits no discharge.   Neck: Normal range of motion. No JVD present. No tracheal deviation present. No thyromegaly present.   Cardiovascular: Normal rate, regular rhythm, normal heart sounds and intact distal pulses.  Exam reveals no gallop and no friction rub.    No murmur heard.  Pulmonary/Chest: Effort normal and breath sounds normal. No stridor. No respiratory distress. He has no wheezes. He has no rales. He exhibits no tenderness.   Abdominal: Soft. Bowel sounds are normal. He exhibits no distension and no mass. There is no tenderness. There is no rebound and no guarding. No hernia.   Obese.  Surgical scar inferior to the umbilicus measuring approximately 3 inches.  No evidence of recurrence   Genitourinary: Rectal exam shows guaiac negative stool.   Musculoskeletal: Normal range of motion. He exhibits no edema, tenderness or deformity.   Lymphadenopathy:     He has no cervical adenopathy.   Neurological: He is alert and oriented to person, place, and time. He has normal reflexes. He displays normal reflexes. No cranial nerve deficit. He exhibits normal muscle tone. Coordination normal.   Skin: Skin is warm and dry. No rash noted. He is not diaphoretic. No erythema. No pallor.   Psychiatric: He has a normal mood and affect. His behavior is normal. " Judgment and thought content normal.   Nursing note and vitals reviewed.                ASSESSMENT  Pain in the umbilicus after umbilical hernia repair ×2.  No clinical evidence of recurrence.  No evidence of infection.        PLAN    We'll get CT scan and go from there.        Hiro Garvey MD

## 2017-12-14 ENCOUNTER — OFFICE VISIT (OUTPATIENT)
Dept: UROLOGY | Facility: CLINIC | Age: 67
End: 2017-12-14

## 2017-12-14 DIAGNOSIS — N32.81 DETRUSOR INSTABILITY: Primary | ICD-10-CM

## 2017-12-14 DIAGNOSIS — N40.0 BPH WITHOUT URINARY OBSTRUCTION: ICD-10-CM

## 2017-12-14 PROCEDURE — 99213 OFFICE O/P EST LOW 20 MIN: CPT | Performed by: UROLOGY

## 2017-12-14 RX ORDER — TAMSULOSIN HYDROCHLORIDE 0.4 MG/1
1 CAPSULE ORAL NIGHTLY
Qty: 30 CAPSULE | Refills: 3 | Status: SHIPPED | OUTPATIENT
Start: 2017-12-14 | End: 2018-02-16 | Stop reason: SDUPTHER

## 2017-12-14 NOTE — PROGRESS NOTES
Chief Complaint:          Chief Complaint   Patient presents with   • Benign Prostatic Hypertrophy       HPI:   67 y.o. male.  67-year-old white male here to establish care has urgency and rare urge related incontinence gets up twice at night.  4 times during the day.  There is no dysuria.  There is no hematuria.  States he was hospitalized previously.  This was years ago he has a remote history of a prior stone.  He has no dribbling.  He has a good stream he is on no medications he sees Dr. Winston and has never had an examination.  She does not remember having a PSA.  He does not remember having a history of diabetes.  He is not a smoker.  He is accompanied by His wife who has a simple renal cyst his PSA was 1.330 his creatinine is 1.05.  He went from getting up multiple times a night to 0 to one time in 2-3 times during the day he still not satisfied with this.  Therefore must start him on Myrbetriq a gave him samples of semen back in 1 month will follow-up with him based on this for detrusor instability in addition to his known BPH.  He is doing great on alpha blockade.  He also likes the Myrbetriq and I went ahead and gave him a prescription for this I'll see him back in 1 year        Past Medical History:        Past Medical History:   Diagnosis Date   • Acid reflux    • Cardiac disease    • Coronary artery disease    • Hypertension    • Osteoarthritis     in both knees   • Rheumatoid arthritis          Current Meds:     Current Outpatient Prescriptions   Medication Sig Dispense Refill   • acetaminophen (TYLENOL) 500 MG tablet Take 500 mg by mouth Every 6 (Six) Hours As Needed for Mild Pain .     • acetaminophen-codeine (TYLENOL #3) 300-30 MG per tablet TAKE 1 TABLET BY MOUTH AS NEEDED FOR PAIN  0   • atenolol (TENORMIN) 50 MG tablet 2 (Two) Times a Day.  0   • butalbital-acetaminophen-caffeine (FIORICET, ESGIC) -40 MG per tablet Take 1 tablet by mouth 1 (One) Time.     • fluticasone (FLONASE) 50 MCG/ACT  nasal spray instill 2 sprays into each nostril once daily  1   • gabapentin (NEURONTIN) 800 MG tablet TAKE ONE TABLET BY MOUTH TWICE DAILY  0   • GLUCOSAMINE-CHONDROITIN PO Take  by mouth Daily.     • isosorbide mononitrate (IMDUR) 120 MG 24 hr tablet Daily.  0   • Loratadine 10 MG capsule Take  by mouth Daily.     • nitroglycerin (NITROSTAT) 0.4 MG SL tablet place 1 tablet under the tongue every 5 minutes for UP TO 3 doses...  (REFER TO PRESCRIPTION NOTES).  0   • omeprazole (priLOSEC) 40 MG capsule take 1 capsule by mouth once daily  0   • RA ASPIRIN EC 81 MG EC tablet Daily.  0   • ranolazine (RANEXA) 1000 MG 12 hr tablet Take 1,000 mg by mouth 2 (Two) Times a Day.     • rOPINIRole (REQUIP) 0.25 MG tablet take 1 tablet by mouth at bedtime  0   • rosuvastatin (CRESTOR) 10 MG tablet Take 10 mg by mouth Daily.     • tamsulosin (FLOMAX) 0.4 MG capsule 24 hr capsule Take 1 capsule by mouth Every Night. 30 capsule 3     No current facility-administered medications for this visit.         Allergies:      Allergies   Allergen Reactions   • Propoxyphene    • Propranolol Rash        Past Surgical History:     Past Surgical History:   Procedure Laterality Date   • CATARACT EXTRACTION     • CORONARY ANGIOPLASTY WITH STENT PLACEMENT     • EYE SURGERY     • HERNIA REPAIR     • KNEE SURGERY     • SINUS SURGERY     • TONSILLECTOMY           Social History:     Social History     Social History   • Marital status:      Spouse name: N/A   • Number of children: N/A   • Years of education: N/A     Occupational History   • Not on file.     Social History Main Topics   • Smoking status: Never Smoker   • Smokeless tobacco: Never Used   • Alcohol use No   • Drug use: No   • Sexual activity: Defer     Other Topics Concern   • Not on file     Social History Narrative       Family History:     Family History   Problem Relation Age of Onset   • Cancer Sister    • Diabetes Sister    • Arthritis Sister      Osteoarthritis and  Rheumatoid   • Osteoporosis Sister    • Hypertension Sister    • Gout Sister    • Cancer Brother    • Diabetes Brother    • Osteoporosis Brother    • Arthritis Brother      Osteoarthritis and Rheumatoid   • Hypertension Brother    • Gout Brother    • Heart attack Father        Review of Systems:     Review of Systems   Constitutional: Negative.    HENT: Negative.    Eyes: Negative.    Respiratory: Negative.    Cardiovascular: Negative.    Gastrointestinal: Negative.    Endocrine: Negative.    Musculoskeletal: Negative.    Allergic/Immunologic: Negative.    Neurological: Negative.    Hematological: Negative.    Psychiatric/Behavioral: Negative.        Physical Exam:     Physical Exam   Constitutional: He is oriented to person, place, and time. He appears well-developed and well-nourished.   HENT:   Head: Normocephalic and atraumatic.   Eyes: Conjunctivae and EOM are normal. Pupils are equal, round, and reactive to light.   Neck: Normal range of motion.   Cardiovascular: Normal rate, regular rhythm, normal heart sounds and intact distal pulses.    Pulmonary/Chest: Effort normal and breath sounds normal.   Abdominal: Soft. Bowel sounds are normal.   Genitourinary: Rectum normal, prostate normal and penis normal.   Musculoskeletal: Normal range of motion.   Neurological: He is alert and oriented to person, place, and time. He has normal reflexes.   Skin: Skin is warm and dry.   Psychiatric: He has a normal mood and affect. His behavior is normal. Judgment and thought content normal.   Nursing note and vitals reviewed.      Procedure:       Assessment:   No diagnosis found.  No orders of the defined types were placed in this encounter.      Plan:   BPH-continue alpha blockade  Detrusor instability-continue Myrbetriq           This document has been electronically signed by EJ URBINA MD December 14, 2017 1:40 PM

## 2017-12-19 ENCOUNTER — TELEPHONE (OUTPATIENT)
Dept: SURGERY | Facility: CLINIC | Age: 67
End: 2017-12-19

## 2017-12-19 NOTE — TELEPHONE ENCOUNTER
I called the patient to let them know that the CT scan was denied by [employer]. He did not answer so I left him a message that the appt tomorrow is canceled to call us back. I called  His wife's number and she called me back. I started to explain that [facility] denied the CT scan but she got upset and disconnected.

## 2017-12-19 NOTE — TELEPHONE ENCOUNTER
The patient called back and told me that he has [place] [employer] and [employer]. I told him he has [employer]. He and his wife was yelling that they have a [place] [employer] card and [employer] and Medicare. I got the number off the [place] card and ran it on [employer]. It says managed by [facility]. I called the patient back and told him the [place] [employer] card is just in case they loose the [employer] card or switch to another medicaid. We can run that number and it tells us who their medicaid provider is. They started yelling and told me he has a [place] [employer] Card and a [employer] Card and hung up.

## 2017-12-20 ENCOUNTER — APPOINTMENT (OUTPATIENT)
Dept: CT IMAGING | Facility: HOSPITAL | Age: 67
End: 2017-12-20
Attending: SURGERY

## 2017-12-26 ENCOUNTER — DOCUMENTATION (OUTPATIENT)
Dept: SURGERY | Facility: CLINIC | Age: 67
End: 2017-12-26

## 2017-12-27 ENCOUNTER — OFFICE VISIT (OUTPATIENT)
Dept: FAMILY MEDICINE CLINIC | Facility: CLINIC | Age: 67
End: 2017-12-27

## 2017-12-27 VITALS
DIASTOLIC BLOOD PRESSURE: 88 MMHG | BODY MASS INDEX: 33.72 KG/M2 | HEART RATE: 54 BPM | WEIGHT: 249 LBS | OXYGEN SATURATION: 97 % | SYSTOLIC BLOOD PRESSURE: 152 MMHG | HEIGHT: 72 IN

## 2017-12-27 DIAGNOSIS — M17.0 ARTHRITIS OF BOTH KNEES: ICD-10-CM

## 2017-12-27 DIAGNOSIS — I10 ESSENTIAL HYPERTENSION: ICD-10-CM

## 2017-12-27 DIAGNOSIS — E78.00 HYPERCHOLESTEROLEMIA: Primary | ICD-10-CM

## 2017-12-27 DIAGNOSIS — E78.5 DYSLIPIDEMIA: ICD-10-CM

## 2017-12-27 DIAGNOSIS — N40.0 BPH WITHOUT URINARY OBSTRUCTION: ICD-10-CM

## 2017-12-27 DIAGNOSIS — K21.9 GASTROESOPHAGEAL REFLUX DISEASE, ESOPHAGITIS PRESENCE NOT SPECIFIED: ICD-10-CM

## 2017-12-27 DIAGNOSIS — I25.119 CORONARY ARTERY DISEASE INVOLVING NATIVE CORONARY ARTERY OF NATIVE HEART WITH ANGINA PECTORIS (HCC): ICD-10-CM

## 2017-12-27 PROCEDURE — 99214 OFFICE O/P EST MOD 30 MIN: CPT | Performed by: NURSE PRACTITIONER

## 2017-12-27 RX ORDER — BUTALBITAL, ACETAMINOPHEN, CAFFEINE AND CODEINE PHOSPHATE 50; 325; 40; 30 MG/1; MG/1; MG/1; MG/1
CAPSULE ORAL
Refills: 0 | COMMUNITY
Start: 2017-11-30

## 2017-12-27 NOTE — PROGRESS NOTES
"Subjective   Juan Edwards is a 67 y.o. male.     Chief Complaint: Establish Care    History of Present Illness   Pt here today to establish care.  Pt was being seen by Dr. Perez in [place].  Pt states that he is not happy with the care he is receiving from Dr. Perez and he thinks \"Dr Perez is crazy.\"  However, the wife has stated that Dr. Perez has told them they have too many health problems for him to continue seeing them.  Pt has a hx of allergy/sinus problems, GERD, RLS, HLD, BPH, CAD, cardiac stent x1 in March 2016.      HTN.  Pt was dx with HTN several years ago.  He is currently taking Atenolol for his BP. He is tolerating the medication well without side effects.  He states that he does occasionally have chest pain and uses NTG SL.  He has not had to use in several weeks.  He continues to follow with cardiologist.      CAD.  Pt has a hx of cardiac stent x1 from March 2016.  He does follow with Dr. Cook, cardiologist, in [place].  He underwent a follow up stress test and Dr Cook told him it was normal and he did not need any intervention.      Allergies.  Pt states that he does have a hx of allergies and has been using loratidine and flonase for his symptoms.  He states that the medication does not work very well but he continues to use them.     GERD.  Pt has been taking Prilosec since 1990s.  He states that he had an EGD done twice.  His last EGD was in the 1990s.  Pt has had a colonoscopy by Dr. Winston in 2015 and another one done in 2016.  He had been having some rectal bleeding.  He has had no further bleeding since that time.       RLS.  Pt has been taking requip for RLS for several years.  He tolerates the medication without any problems.  He only takes the medication at night.      HLD.  Pt had been taking lipitor for the HLD.  His lipid panel was still not within normal findings and his medication was changed to crestor.  He is doing well with the medication and tolerating well. He denies " any myalgias.      Migraines.  Pt states that he has had migraines since he was a child.  He was actually dx in 1990s.  He has a lot of tearing from his right eye.  He does have aura prior to migraine.  His headaches can start on any side of his head and then becomes moderate to severe.  He states that he gets migraines on average every other day.  There is a family hx of migraines.  Migraines last anywhere from a few hours to days.  He is currently taking fioricet for his migraines.  He was given a prescription for Tylenol #3 and he states that the tylenol #3 does not help with his migraines.       OA in knees.  Pt states that he has a lot of pain in his knees and he has a long hx of arthritis.  He states that he also takes pain medication for his knee pain.      Chronic back pain. Pt states that he has a long hx of chronic back pain and has been told that he has degenerative disc disease in his lower back area.  He had an MRI in 2011 and has not had another repeat MRI since that time.  I have discussed with patient today that we would not be able to prescribe his pain medications, Tylenol #3, Fioricet and gabapentin.  Pt became very upset and said that he would not return to this office due to the fact that he could not have pain medication prescribed from us.  I have offered to refer him to pain management for his chronic pain and he is not agreeable to being referred to a pain clinic.    I have also discussed this with Dr Gonzalez and she is in agreement that his pain medication should not be prescribed.  His CHANO # [id number] has been reviewed and it appears that he has been evaluated by 2 different providers in the past 3-4 months and prescribed pain medication from both of them.  His last prescription was filled on 12/21/2017 for gabapentin from Dr Perez.      Family History   Problem Relation Age of Onset   • Cancer Sister    • Diabetes Sister    • Arthritis Sister      Osteoarthritis and Rheumatoid   •  "Osteoporosis Sister    • Hypertension Sister    • Gout Sister    • Cancer Brother    • Diabetes Brother    • Osteoporosis Brother    • Arthritis Brother      Osteoarthritis and Rheumatoid   • Hypertension Brother    • Gout Brother    • Heart attack Father        Social History     Social History   • Marital status:      Spouse name: N/A   • Number of children: N/A   • Years of education: N/A     Occupational History   • Not on file.     Social History Main Topics   • Smoking status: Never Smoker   • Smokeless tobacco: Never Used   • Alcohol use No   • Drug use: No   • Sexual activity: Defer     Other Topics Concern   • Not on file     Social History Narrative       Past Medical History:   Diagnosis Date   • Acid reflux    • Cardiac disease    • Coronary artery disease    • Hypertension    • Osteoarthritis     in both knees   • Rheumatoid arthritis        Review of Systems   Constitutional: Negative.    HENT: Negative.    Respiratory: Negative.    Cardiovascular: Negative.    Gastrointestinal: Negative.    Musculoskeletal: Positive for arthralgias and neck pain.   Skin: Negative.    Neurological: Negative.    Psychiatric/Behavioral: Negative.        Objective   Physical Exam   Constitutional: He is oriented to person, place, and time. He appears well-developed and well-nourished.   Neck: Normal range of motion. Neck supple.   Cardiovascular: Normal rate.    Pulmonary/Chest: Effort normal.   Neurological: He is alert and oriented to person, place, and time.   Skin: Skin is warm and dry.   Psychiatric: He has a normal mood and affect. His behavior is normal. Judgment and thought content normal.   Nursing note and vitals reviewed.      Procedures    Vitals: Blood pressure 152/88, pulse 54, height 182.9 cm (72\"), weight 113 kg (249 lb), SpO2 97 %.    Allergies:   Allergies   Allergen Reactions   • Propoxyphene    • Propranolol Rash          Assessment/Plan   Juan was seen today for establish care.    Diagnoses " and all orders for this visit:    Hypercholesterolemia    Coronary artery disease involving native coronary artery of native heart with angina pectoris    Essential hypertension    Gastroesophageal reflux disease, esophagitis presence not specified    Arthritis of both knees    Dyslipidemia    BPH without urinary obstruction

## 2018-01-09 DIAGNOSIS — N40.0 BPH WITHOUT URINARY OBSTRUCTION: ICD-10-CM

## 2018-01-09 NOTE — TELEPHONE ENCOUNTER
PATIENT CALLED AND WAS IRATE ABOUT HAVING TO PAY A $8.35 COPAY FOR THIS MYRBETRIQ AT HIS REGULAR PHARMACY [facility].  HE SAID THAT IF HE WAS GOING TO HAVE TO PAY A COPAY HE WANTED IT SENT TO [facility] BECAUSE IT WAS CHEAPER THAT WAY.

## 2018-02-01 ENCOUNTER — OFFICE VISIT (OUTPATIENT)
Dept: ORTHOPEDIC SURGERY | Facility: CLINIC | Age: 68
End: 2018-02-01

## 2018-02-01 VITALS — BODY MASS INDEX: 33.46 KG/M2 | WEIGHT: 247 LBS | HEIGHT: 72 IN

## 2018-02-01 DIAGNOSIS — M17.0 PRIMARY OSTEOARTHRITIS OF BOTH KNEES: Primary | ICD-10-CM

## 2018-02-01 PROCEDURE — 20610 DRAIN/INJ JOINT/BURSA W/O US: CPT | Performed by: ORTHOPAEDIC SURGERY

## 2018-02-01 RX ORDER — ACETAMINOPHEN 160 MG
TABLET,CHEWABLE ORAL
Refills: 0 | COMMUNITY
Start: 2017-12-27

## 2018-02-01 RX ORDER — LORATADINE 10 MG/1
TABLET ORAL
Refills: 0 | COMMUNITY
Start: 2017-12-04

## 2018-02-01 RX ORDER — MECLIZINE HYDROCHLORIDE 25 MG/1
TABLET ORAL
Refills: 0 | COMMUNITY
Start: 2018-01-15

## 2018-02-01 RX ORDER — CHLORHEXIDINE GLUCONATE 0.12 MG/ML
RINSE ORAL
Refills: 0 | COMMUNITY
Start: 2017-12-29

## 2018-02-01 NOTE — PROGRESS NOTES
"  Patient: Juan Edwards    YOB: 1950        History of Present Illness: Patient presents back today for bilateral Visco supplementation with Synvisc 1.  He has received this in the past with good results    Past Medical History:   Diagnosis Date   • Acid reflux    • Cardiac disease    • Coronary artery disease    • Hypertension    • Osteoarthritis     in both knees   • Rheumatoid arthritis         Social History     Social History   • Marital status:      Spouse name: N/A   • Number of children: N/A   • Years of education: N/A     Occupational History   • Not on file.     Social History Main Topics   • Smoking status: Never Smoker   • Smokeless tobacco: Never Used   • Alcohol use No   • Drug use: No   • Sexual activity: Defer     Other Topics Concern   • Not on file     Social History Narrative        Review of Systems:    Pertinent positives evaluated and listed in HPI, others systems completed by patient and reviewed today.         Physical Exam: 67 y.o. male  General Appearance:    Alert and oriented x 3, cooperative, in no acute distress                   Vitals:    02/01/18 1028   Weight: 112 kg (247 lb)   Height: 182.9 cm (72\")          Knee exam essentially unchanged      Radiology:           Large Joint Arthrocentesis  Date/Time: 2/1/2018 10:59 AM  Consent given by: patient  Site marked: site marked  Supporting Documentation  Indications: pain and diagnostic evaluation   Procedure Details  Location: knee - R knee  Needle size: 25 G  Approach: anterolateral  Medications administered: 48 mg hylan 48 MG/6ML  Patient tolerance: patient tolerated the procedure well with no immediate complications    Large Joint Arthrocentesis  Date/Time: 2/1/2018 11:00 AM  Consent given by: patient  Site marked: site marked  Supporting Documentation  Indications: pain and diagnostic evaluation   Procedure Details  Location: knee - L knee  Needle size: 25 G  Approach: anterolateral  Medications " administered: 48 mg hylan 48 MG/6ML  Patient tolerance: patient tolerated the procedure well with no immediate complications                Assessment/Plan: Patient tolerated the procedure well.  Ice and relative rest for the next 24-48 hours as needed.  He will call us in the future as needed          Discussion/Summary:    This chart was completed utilizing the dragon speech recognition software.  Grammatical errors, random word insertions, pronoun errors, and incomplete sentences or occasional consequences of the system due to software limitations, ambient noise, and hardware issues.  Any questions or concerns about the content, text, or information contained within the body of this dictation should be directly addressed to the physician for clarification        This document was signed by Gustabo Bhardwaj M.D. February 1, 2018 10:59 AM

## 2018-02-16 DIAGNOSIS — N40.0 BPH WITHOUT URINARY OBSTRUCTION: ICD-10-CM

## 2018-02-16 RX ORDER — TAMSULOSIN HYDROCHLORIDE 0.4 MG/1
1 CAPSULE ORAL NIGHTLY
Qty: 30 CAPSULE | Refills: 10 | Status: SHIPPED | OUTPATIENT
Start: 2018-02-16 | End: 2018-12-17 | Stop reason: SDUPTHER

## 2018-02-16 NOTE — TELEPHONE ENCOUNTER
Patient called and needs Tamsulosin refilled until he comes back to see Dr. Pimentel. E-scribing med refill now.

## 2018-12-17 DIAGNOSIS — N40.0 BPH WITHOUT URINARY OBSTRUCTION: ICD-10-CM

## 2018-12-17 RX ORDER — TAMSULOSIN HYDROCHLORIDE 0.4 MG/1
CAPSULE ORAL
Qty: 30 CAPSULE | Refills: 0 | Status: SHIPPED | OUTPATIENT
Start: 2018-12-17